# Patient Record
Sex: MALE | Race: WHITE | Employment: OTHER | URBAN - METROPOLITAN AREA
[De-identification: names, ages, dates, MRNs, and addresses within clinical notes are randomized per-mention and may not be internally consistent; named-entity substitution may affect disease eponyms.]

---

## 2024-03-24 ENCOUNTER — HOSPITAL ENCOUNTER (EMERGENCY)
Facility: HOSPITAL | Age: 74
Discharge: HOME | End: 2024-03-24
Attending: EMERGENCY MEDICINE
Payer: MEDICARE

## 2024-03-24 VITALS
RESPIRATION RATE: 17 BRPM | SYSTOLIC BLOOD PRESSURE: 187 MMHG | WEIGHT: 315 LBS | OXYGEN SATURATION: 95 % | HEART RATE: 86 BPM | DIASTOLIC BLOOD PRESSURE: 87 MMHG | TEMPERATURE: 98 F

## 2024-03-24 DIAGNOSIS — R51.9 NONINTRACTABLE HEADACHE, UNSPECIFIED CHRONICITY PATTERN, UNSPECIFIED HEADACHE TYPE: Primary | ICD-10-CM

## 2024-03-24 PROCEDURE — 96361 HYDRATE IV INFUSION ADD-ON: CPT

## 2024-03-24 PROCEDURE — 99284 EMERGENCY DEPT VISIT MOD MDM: CPT

## 2024-03-24 PROCEDURE — 93005 ELECTROCARDIOGRAM TRACING: CPT

## 2024-03-24 PROCEDURE — 96375 TX/PRO/DX INJ NEW DRUG ADDON: CPT

## 2024-03-24 PROCEDURE — 93010 ELECTROCARDIOGRAM REPORT: CPT

## 2024-03-24 PROCEDURE — 96374 THER/PROPH/DIAG INJ IV PUSH: CPT

## 2024-03-24 RX ORDER — ACETAMINOPHEN 500 MG
1000 TABLET ORAL ONCE
Status: COMPLETED | OUTPATIENT
Start: 2024-03-24 | End: 2024-03-24

## 2024-03-24 RX ORDER — DIPHENHYDRAMINE HYDROCHLORIDE 50 MG/ML
25 INJECTION INTRAMUSCULAR; INTRAVENOUS ONCE
Status: COMPLETED | OUTPATIENT
Start: 2024-03-24 | End: 2024-03-24

## 2024-03-24 RX ORDER — PROCHLORPERAZINE EDISYLATE 5 MG/ML
10 INJECTION INTRAMUSCULAR; INTRAVENOUS ONCE
Status: COMPLETED | OUTPATIENT
Start: 2024-03-24 | End: 2024-03-24

## 2024-03-24 NOTE — ED PROVIDER NOTES
Patient Seen in: [facility] Emergency Department      History     Chief Complaint   Patient presents with    Headache    Dizziness     Stated Complaint: Hypertension    Subjective:   HPI        Objective:   Past Medical History:   Diagnosis Date    Essential hypertension     Hyperlipidemia               History reviewed. No pertinent surgical history.             Social History     Socioeconomic History    Marital status: Single   Tobacco Use    Smoking status: Never    Smokeless tobacco: Never              Review of Systems    Positive for stated complaint: Hypertension  Other systems are as noted in HPI.  Constitutional and vital signs reviewed.      All other systems reviewed and negative except as noted above.    Physical Exam     ED Triage Vitals [03/24/24 1345]   BP (!) 184/95   Pulse 77   Resp 18   Temp 97.9 °F (36.6 °C)   Temp src Oral   SpO2 94 %   O2 Device None (Room air)       Current:BP (!) 187/87   Pulse 86   Temp 97.9 °F (36.6 °C) (Oral)   Resp 17   Wt (!) 142.9 kg   SpO2 95%         Physical Exam          ED Course   Labs Reviewed - No data to display  EKG    Rate, intervals and axes as noted on EKG Report.  Rate: 77  Rhythm: Sinus Rhythm  Reading: Normal sinus rhythm without signs of acute ischemia or abnormal intervals.                            MDM      73-year-old male with a history of hypertension presents today with headache and dizziness.  Patient states that he started having a headache last night which she describes as slow onset, whole head, moderate to severe in intensity.  He also noted blood pressures elevated from his baseline up to the 180s over 90s.  He does state that he has not been taking his hydrochlorothiazide consistently.  He took Norco last night.  Headache continued this morning and he took ibuprofen and later aspirin with little relief.  He states while he has had a headache he has had some dizziness as well.  Denies blurry vision, difficulty speaking,  numbness/weakness, fevers, neck stiffness, or other symptoms.  Dizziness not affected by head movement.    On exam, hypertensive 180s over 90s, otherwise normal vitals, well-appearing, neuroexam with no nystagmus, midpoint and equally reactive pupils, no facial droop, no gaze deviation, no visual field deficits, no dysarthria, no pronator drift, strength and sensation intact and equal in the upper extremities.  Baseline chronic weakness in the right lower extremity.    Differential: Primary headache versus migraine.  Considered but less likely intracranial hemorrhage.  Also less likely CVA given no nystagmus on exam.    Plan to treat with a migraine cocktail and reeval.  Will consider broadening workup based on reexamination.    On reexamination, symptoms had resolved completely.  Remains somewhat hypertensive but no current clinical concern for hypertensive emergency.    Advised on care instructions, close PMD follow-up, and given careful return precautions.                           Medical Decision Making      Disposition and Plan     Clinical Impression:  1. Nonintractable headache, unspecified chronicity pattern, unspecified headache type         Disposition:  Discharge  3/24/2024  4:15 pm    Follow-up:  Your primary care doctor    Follow up in 1 week(s)      We recommend that you schedule follow up care with a primary care provider within the next three months to obtain basic health screening including reassessment of your blood pressure.      Medications Prescribed:  There are no discharge medications for this patient.

## 2024-03-24 NOTE — ED INITIAL ASSESSMENT (HPI)
Patient complains of headache and dizziness since this am. Checked BP and noted it was elevated. Hx htn, reports that he hasn't been taking his meds. No neuro deficits noted.

## 2024-03-25 LAB
ATRIAL RATE: 77 BPM
P AXIS: 61 DEGREES
P-R INTERVAL: 150 MS
Q-T INTERVAL: 408 MS
QRS DURATION: 96 MS
QTC CALCULATION (BEZET): 461 MS
R AXIS: 22 DEGREES
T AXIS: 17 DEGREES
VENTRICULAR RATE: 77 BPM

## 2025-01-31 ENCOUNTER — HOSPITAL ENCOUNTER (EMERGENCY)
Facility: HOSPITAL | Age: 75
Discharge: HOME | End: 2025-01-31
Attending: EMERGENCY MEDICINE
Payer: MEDICARE

## 2025-01-31 ENCOUNTER — APPOINTMENT (OUTPATIENT)
Dept: GENERAL RADIOLOGY | Facility: HOSPITAL | Age: 75
End: 2025-01-31
Attending: EMERGENCY MEDICINE
Payer: MEDICARE

## 2025-01-31 VITALS
DIASTOLIC BLOOD PRESSURE: 84 MMHG | BODY MASS INDEX: 39.98 KG/M2 | SYSTOLIC BLOOD PRESSURE: 145 MMHG | TEMPERATURE: 99 F | RESPIRATION RATE: 18 BRPM | WEIGHT: 311.5 LBS | HEART RATE: 83 BPM | HEIGHT: 74 IN | OXYGEN SATURATION: 97 %

## 2025-01-31 DIAGNOSIS — S63.91XA SPRAIN OF RIGHT HAND, INITIAL ENCOUNTER: ICD-10-CM
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
## 2025-01-31 DIAGNOSIS — S53.401A SPRAIN OF RIGHT ELBOW, INITIAL ENCOUNTER: ICD-10-CM

## 2025-01-31 DIAGNOSIS — M54.50 ACUTE LOW BACK PAIN WITHOUT SCIATICA, UNSPECIFIED BACK PAIN LATERALITY: ICD-10-CM

## 2025-01-31 PROCEDURE — 99283 EMERGENCY DEPT VISIT LOW MDM: CPT

## 2025-01-31 PROCEDURE — 72100 X-RAY EXAM L-S SPINE 2/3 VWS: CPT | Performed by: EMERGENCY MEDICINE

## 2025-01-31 PROCEDURE — 73080 X-RAY EXAM OF ELBOW: CPT | Performed by: EMERGENCY MEDICINE

## 2025-01-31 PROCEDURE — 99284 EMERGENCY DEPT VISIT MOD MDM: CPT

## 2025-01-31 PROCEDURE — 73130 X-RAY EXAM OF HAND: CPT | Performed by: EMERGENCY MEDICINE

## 2025-01-31 RX ORDER — IBUPROFEN 600 MG/1
600 TABLET, FILM COATED ORAL EVERY 8 HOURS PRN
Qty: 30 TABLET | Refills: 0 | Status: SHIPPED | OUTPATIENT
Start: 2025-01-31 | End: 2025-02-07

## 2025-01-31 NOTE — DISCHARGE INSTRUCTIONS
Continue taking norco as needed for extreme pain.  
You might feel nauseated today. Eat lightly until you are feeling better. If nausea continues for more than 24 hours, please call your doctor. If you do not feel nauseated, you may eat anything you like for the rest of the day.

## 2025-01-31 NOTE — ED PROVIDER NOTES
Patient Seen in: [facility] Emergency Department    History     Chief Complaint   Patient presents with    Fall       HPI    History is provided by patient/independent historian: Patient   74 year old male with history of hypertension, hyperlipidemia, thyroid cancer, here with complaints of multiple areas of pain after a fall 2 weeks ago.  He required taking Norco at home and is still taking it secondary to ongoing pain and became concerned that the pain was lasting so long.  No head injury or loss consciousness.  Patient is mostly complaining of right elbow, left hand, and lower back pain.  He is still ambulatory.  Does not take any blood thinners.    History reviewed.   Past Medical History:    Back pain    Essential hypertension    Hyperlipidemia    Thyroid cancer (HCC)         History reviewed.   Past Surgical History:   Procedure Laterality Date    Excis lumbar disk,one level           Home Medications reviewed :  Prescriptions Prior to Admission[1]      History reviewed.   Social History     Socioeconomic History    Marital status: Single   Tobacco Use    Smoking status: Never    Smokeless tobacco: Never   Vaping Use    Vaping status: Never Used   Substance and Sexual Activity    Alcohol use: Never    Drug use: Never         ROS  Review of Systems   Respiratory:  Negative for shortness of breath.    Cardiovascular:  Negative for chest pain.   Musculoskeletal:  Positive for arthralgias and back pain.   All other systems reviewed and are negative.     All other pertinent organ systems are reviewed and are negative.      Physical Exam     ED Triage Vitals [01/31/25 1059]   /88   Pulse 105   Resp 18   Temp 98.5 °F (36.9 °C)   Temp src Oral   SpO2 95 %   O2 Device None (Room air)     Vital signs reviewed.      Physical Exam  Vitals and nursing note reviewed.   Cardiovascular:      Pulses: Normal pulses.   Pulmonary:      Effort: No respiratory distress.   Abdominal:      General: There is no  distension.   Musculoskeletal:      Comments: R elbow tender but full ROM, R hand unremarkable, R wrist unremrakable, b/l radial pulses present, L hand old laceration to palmar aspect healing without signs of infection, + L spine tenderness, pelvis stable   Neurological:      Mental Status: He is alert.         ED Course       Labs:   Labs Reviewed - No data to display      My EKG Interpretation:   As reviewed and Interpreted by me      Imaging Results Available and Reviewed while in ED:   XR LUMBAR SPINE (MIN 2 VIEWS) (CPT=72100)    Result Date: 1/31/2025  CONCLUSION: Degenerative and postsurgical changes as described.  No radiographic evidence of acute fracture or hardware loosening.    Dictated by (CST): Antonio Mills MD on 1/31/2025 at 12:55 PM     Finalized by (CST): Antonio Mills MD on 1/31/2025 at 1:01 PM          XR HAND (MIN 3 VIEWS), LEFT (CPT=73130)    Result Date: 1/31/2025  CONCLUSION: No acute fracture/dislocation.    Dictated by (CST): Antonio Mills MD on 1/31/2025 at 12:53 PM     Finalized by (CST): Antonio Mills MD on 1/31/2025 at 12:55 PM          XR ELBOW, COMPLETE (MIN 3 VIEWS), RIGHT (CPT=73080)    Result Date: 1/31/2025  CONCLUSION: No acute fracture/dislocation.    Dictated by (CST): Antonio Mills MD on 1/31/2025 at 12:52 PM     Finalized by (CST): Antonio Mills MD on 1/31/2025 at 12:53 PM         My review and independent interpretation of XR images: no fracture. Radiology report corroborates this in addition to other details as reported by them.      Decision rules/scores evaluated: none      Diagnostic labs/tests considered but not ordered: CBC, BMp, INR    ED Medications Administered: Medications - No data to display             MDM       Medical Decision Making      Differential Diagnosis: After obtaining the patient's history, performing the physical exam and reviewing the diagnostics, multiple initial diagnoses were considered based on the presenting problem including  fracture, contusion, sprain, dislocation    External document review: I personally reviewed available external medical records for any recent pertinent discharge summaries, testing, and procedures - the findings are as follows: 10/30/24 visit with Dr. Eddy for  crown fell off    Complicating Factors: The patient already  has a past medical history of Back pain, Essential hypertension, Hyperlipidemia, and Thyroid cancer (HCC). to contribute to the complexity of this ED evaluation.    Procedures performed: none    Discussed management with physician/appropriate source: none    Considered admission/deescalation of care for: none    Social determinants of health affecting patient care: none    Prescription medications considered: prescription strength ibuprofen, discussed continuing current medication regimen    The patient requires continuous monitoring for: fall, pain    Shared decision making: discussed possible admission        Disposition and Plan     Clinical Impression:  1. Fall, initial encounter    2. Sprain of right hand, initial encounter    3. Sprain of right elbow, initial encounter    4. Acute low back pain without sciatica, unspecified back pain laterality        Disposition:  Discharge    Follow-up:  Calvin Vazquez  [address] [facility] [address]  [facility] [id number] [phone]    Follow up      Yariel Gurrola MD  [address] [facility]  [address]  [place] [id number] [phone]    Follow up        Medications Prescribed:  Current Discharge Medication List        START taking these medications    Details   ibuprofen 600 MG Oral Tab Take 1 tablet (600 mg total) by mouth every 8 (eight) hours as needed for Pain or Fever.  Qty: 30 tablet, Refills: 0                            [1] (Not in a hospital admission)

## 2025-01-31 NOTE — ED INITIAL ASSESSMENT (HPI)
Abhinav arrives ambulatory with a cane complaining of multiple areas of pain from a fall 2 weeks ago on concrete.   Pain to left hand, right forearm/elbow, bilateral shoulders, neck and mid back pains.   Taking Norco from a previous prescription without much relief.   Denies anticoagulants